# Patient Record
Sex: FEMALE | Race: WHITE | NOT HISPANIC OR LATINO | Employment: FULL TIME | URBAN - METROPOLITAN AREA
[De-identification: names, ages, dates, MRNs, and addresses within clinical notes are randomized per-mention and may not be internally consistent; named-entity substitution may affect disease eponyms.]

---

## 2017-03-06 ENCOUNTER — OFFICE VISIT (OUTPATIENT)
Dept: URGENT CARE | Facility: MEDICAL CENTER | Age: 22
End: 2017-03-06
Payer: COMMERCIAL

## 2017-03-06 ENCOUNTER — HOSPITAL ENCOUNTER (OUTPATIENT)
Dept: RADIOLOGY | Facility: MEDICAL CENTER | Age: 22
Discharge: HOME | End: 2017-03-06
Attending: PHYSICIAN ASSISTANT | Admitting: FAMILY MEDICINE
Payer: COMMERCIAL

## 2017-03-06 DIAGNOSIS — S93.409A SPRAIN OF LIGAMENT OF ANKLE: ICD-10-CM

## 2017-03-06 PROCEDURE — 99213 OFFICE O/P EST LOW 20 MIN: CPT

## 2017-03-06 PROCEDURE — 73610 X-RAY EXAM OF ANKLE: CPT

## 2018-01-10 NOTE — MISCELLANEOUS
Message   Date: 02 May 2016 10:01 AM EST, Recorded By: Randi Da Silva For: Poly Garza   Caller: Clarence Espinal, Self   Phone: [phone] (Home)   Reason: Medical Complaint   Patient called and stated she started with a sorethroat and swollen tonsils on Saturday  She has no fever, and has tried Dayquil with no relief  I offered her 2 appts on 2 different days and she was unable to commit to those days  I advised that she needs to be evaluated and to go to [facility] or [place]  Patient agreed and understood  Active Problems    1  Acute pharyngitis (462) (J02 9)   2  Asthma (493 90) (J45 909)   3  Classic migraine with aura (346 00) (G43 109)   4  Cyst of ovary, right (620 2) (N83 20)   5  Depression with anxiety (300 4) (F41 8)   6  History of allergy (V15 09) (Z88 9)   7  Lymphadenopathy (785 6) (R59 1)    Current Meds   1  EpiPen 0 3 MG/0 3ML ADEN; Therapy: [id number] to (Last Rx:05Jan2012)  Requested for: 74ZCW0003 Ordered    Allergies    1  Strawberry Flavor LIQD    2  Strawberry   3  Bee sting   4  Nuts    Signatures   Electronically signed by : Natalie Trevizo, ; May  2 2016 10:12AM EST                       (Author)    Electronically signed by :  Sundar Paez DO; May  2 2016 11:14AM EST                       (Author)

## 2018-01-18 NOTE — PROGRESS NOTES
Assessment    1  Ankle sprain (845 00) (S93 409A)    Plan  Ankle sprain    · * XR ANKLE 3+ VIEW RIGHT; Status:Active; Requested for:06Mar2017;    · Air cast; Status:Complete;   Done: 39TYV2258    ice, AIR CAST, motrin  rest      Chief Complaint    1  Ankle Pain  Chief Complaint Free Text Note Form: Patient presents with right ankle pain after slipping in the yard on Friday  Patient has no further complaints  History of Present Illness  HPI: 23 y/o F c/o R ankle pain 4 days ago  tripped and fell, painful to WB and move  no giving out or popping  Hospital Based Practices Required Assessment:   Pain Assessment   the patient states they have pain  The pain is located in the right ankle  The patient describes the pain as dull and aching  (on a scale of 0 to 10, the patient rates the pain at 5 )   Abuse And Domestic Violence Screen    Yes, the patient is safe at home  The patient states no one is hurting them  Depression And Suicide Screen  No, the patient has not had thoughts of hurting themself  No, the patient has not felt depressed in the past 7 days  Prefered Language is  english  Primary Language is  english  Readiness To Learn: Receptive  Barriers To Learning: none  Preferred Learning: verbal   Education Completed: disease/condition, medications and treatment/procedure   Teaching Method: verbal   Person Taught: patient   Evaluation Of Learning: verbalized/demonstrated understanding      Active Problems    1  Acute frontal sinusitis, recurrence not specified (461 1) (J01 10)   2  Acute pharyngitis (462) (J02 9)   3  Acute viral pharyngitis (462) (J02 8,B97 89)   4  Asthma (493 90) (J45 909)   5  Classic migraine with aura (346 00) (G43 109)   6  Cyst of ovary, right (620 2) (N83 201)   7  Depression with anxiety (300 4) (F41 8)   8  History of allergy (V15 09) (Z88 9)   9  Lymphadenopathy (785 6) (R59 1)   10  Sinus congestion (478 19) (R09 81)   11   Sore throat (462) (J02 9)    Past Medical History    1  History of abdominal pain (V13 89) (Z87 898)   2  History of acute sinusitis (V12 69) (Z87 09)   3  History of acute sinusitis (V12 69) (Z87 09)   4  History of lymphadenopathy (V13 89) (Z87 898)   5  History of varicella (V12 09) (Z86 19)   6  History of varicella (V12 09) (Z86 19)    Family History  Mother    1  Family history of Migraine Headache    Social History    · Never A Smoker   · Parents  (V61 03)    Surgical History    1  Denied: History Of Prior Surgery    Current Meds   1  EpiPen 0 3 MG/0 3ML ADEN; Therapy: 19ZXG4099 to (Last Rx:05Jan2012)  Requested for: [id number] Ordered   2  Sprintec 28 0 25-35 MG-MCG Oral Tablet; Take as directed Recorded    Allergies    1  Strawberry Flavor LIQD    2  Strawberry   3  Bee sting   4  Nuts    Vitals  Signs   Recorded: 23EMR6453 11:35AM   Temperature: 98 1 F, Temporal  Heart Rate: 90, R Radial  Pulse Quality: Normal, R Radial  Respiration Quality: Normal  Respiration: 18  Systolic: 037, Sitting  Diastolic: 77, Sitting  Height: 5 ft 8 in  Weight: 243 lb   BMI Calculated: 36 95  BSA Calculated: 2 22  O2 Saturation: 98, RA  Pain Scale: 5    Physical Exam    Constitutional   General appearance: No acute distress, well appearing and well nourished  Musculoskeletal   Inspection/palpation of joints, bones, and muscles: Abnormal   R ankle ttp over ATFL, full ROM but painfiul foot abduction and adduction, inversion  no bruising  Results/Data  Diagnostic Studies Reviewed: I personally reviewed the films/images/results in the office today  My interpretation follows  X-ray Review XR rev shows no fracture  Message  Return to work or school:   Devendra Chery is under my professional care  She was seen in my office on 3/6/17   She is able to return to work on  3/8/17    She is able to work with limitations (R ankle sprain)  excuse due to injury        Signatures   Electronically signed by : Denys Aburto, [facility]; Mar  6 2017  1:18PM EST (Author)    Electronically signed by : MYRTLE Finn ; Mar  9 2017  8:12AM EST                       (Co-author)

## 2018-01-18 NOTE — MISCELLANEOUS
Message  Return to work or school:   Maritza Vargas is under my professional care  She was seen in my office on 3/6/17   She is able to return to work on  3/8/17    She is able to work with limitations (R ankle sprain)  excuse due to injury        Signatures   Electronically signed by : Sharmaine Ewing [facility]; Mar  6 2017  1:18PM EST                       (Author)